# Patient Record
Sex: FEMALE | Race: WHITE | NOT HISPANIC OR LATINO | Employment: OTHER | URBAN - METROPOLITAN AREA
[De-identification: names, ages, dates, MRNs, and addresses within clinical notes are randomized per-mention and may not be internally consistent; named-entity substitution may affect disease eponyms.]

---

## 2017-10-31 ENCOUNTER — CONTACT LENS EXAM ESTABLISHED (OUTPATIENT)
Dept: URBAN - METROPOLITAN AREA CLINIC 35 | Facility: CLINIC | Age: 62
End: 2017-10-31

## 2017-10-31 DIAGNOSIS — H52.03: ICD-10-CM

## 2017-10-31 DIAGNOSIS — H43.393: ICD-10-CM

## 2017-10-31 PROCEDURE — 92014 COMPRE OPH EXAM EST PT 1/>: CPT

## 2017-10-31 PROCEDURE — 92015 DETERMINE REFRACTIVE STATE: CPT

## 2017-10-31 PROCEDURE — 92310-1 LEVEL 1 CONTACT LENS MANAGEMENT

## 2017-10-31 PROCEDURE — 92250 FUNDUS PHOTOGRAPHY W/I&R: CPT

## 2017-10-31 ASSESSMENT — VISUAL ACUITY
OS_SC: 20/50-2
OS_SC: J12
OD_SC: 20/60-2

## 2017-10-31 ASSESSMENT — TONOMETRY
OD_IOP_MMHG: 15
OS_IOP_MMHG: 17

## 2017-10-31 ASSESSMENT — KERATOMETRY
OD_AXISANGLE_DEGREES: 179
OS_K1POWER_DIOPTERS: 44.50
OS_AXISANGLE2_DEGREES: 093
OD_K1POWER_DIOPTERS: 44.25
OD_K2POWER_DIOPTERS: 45.25
OD_AXISANGLE2_DEGREES: 089
OS_AXISANGLE_DEGREES: 003
OS_K2POWER_DIOPTERS: 45.00

## 2017-11-07 ENCOUNTER — TELEPHONE (OUTPATIENT)
Dept: FAMILY MEDICINE CLINIC | Facility: CLINIC | Age: 62
End: 2017-11-07

## 2017-11-08 NOTE — TELEPHONE ENCOUNTER
Please contact patient. Last visit here in the office was in 2013. If she is seeing different PCP please remove my name as her PCP.   If she thinks that she will be coming for her health curse needs to our office please schedule complete physical so we ca

## 2019-04-15 ENCOUNTER — CONTACT LENS EXAM ESTABLISHED (OUTPATIENT)
Dept: URBAN - METROPOLITAN AREA CLINIC 35 | Facility: CLINIC | Age: 64
End: 2019-04-15

## 2019-04-15 DIAGNOSIS — H52.03: ICD-10-CM

## 2019-04-15 DIAGNOSIS — H52.203: ICD-10-CM

## 2019-04-15 DIAGNOSIS — H43.813: ICD-10-CM

## 2019-04-15 PROCEDURE — 92310-1 LEVEL 1 CONTACT LENS MANAGEMENT

## 2019-04-15 PROCEDURE — 92015 DETERMINE REFRACTIVE STATE: CPT

## 2019-04-15 PROCEDURE — 92014 COMPRE OPH EXAM EST PT 1/>: CPT

## 2019-04-15 ASSESSMENT — KERATOMETRY
OS_AXISANGLE2_DEGREES: 85
OD_AXISANGLE_DEGREES: 3
OD_K1POWER_DIOPTERS: 44.5
OD_K2POWER_DIOPTERS: 45.25
OS_K2POWER_DIOPTERS: 45.25
OS_K1POWER_DIOPTERS: 44.5
OD_AXISANGLE2_DEGREES: 93
OS_AXISANGLE_DEGREES: 175

## 2019-04-15 ASSESSMENT — TONOMETRY
OD_IOP_MMHG: 10
OS_IOP_MMHG: 11

## 2021-07-30 ENCOUNTER — CONTACT LENS EXAM ESTABLISHED (OUTPATIENT)
Dept: URBAN - METROPOLITAN AREA CLINIC 35 | Facility: CLINIC | Age: 66
End: 2021-07-30

## 2021-07-30 DIAGNOSIS — H43.813: ICD-10-CM

## 2021-07-30 DIAGNOSIS — H52.03: ICD-10-CM

## 2021-07-30 PROCEDURE — 92015 DETERMINE REFRACTIVE STATE: CPT

## 2021-07-30 PROCEDURE — 92134 CPTRZ OPH DX IMG PST SGM RTA: CPT

## 2021-07-30 PROCEDURE — 92014 COMPRE OPH EXAM EST PT 1/>: CPT

## 2021-07-30 ASSESSMENT — KERATOMETRY
OD_K1POWER_DIOPTERS: 44.25
OS_K1POWER_DIOPTERS: 44.00
OD_AXISANGLE2_DEGREES: 81
OS_K2POWER_DIOPTERS: 44.75
OS_AXISANGLE2_DEGREES: 106
OD_AXISANGLE_DEGREES: 171
OD_K2POWER_DIOPTERS: 45.25
OS_AXISANGLE_DEGREES: 016

## 2021-07-30 ASSESSMENT — VISUAL ACUITY
OS_SC: 20/100-1
OD_PH: 20/25
OD_SC: 20/100-1
OD_CC: J2
OS_CC: J2
OS_PH: 20/25

## 2021-07-30 ASSESSMENT — TONOMETRY
OS_IOP_MMHG: 11
OD_IOP_MMHG: 10

## (undated) NOTE — LETTER
11/9/2017    3 [facility] Greg Condestephenieroxanne [facility] [id number]    Dear Ms. Martinez [facility] has been trying to contact you to schedule an appointment with your provider.   It is important that we reach you to discuss your healthcar